# Patient Record
Sex: FEMALE | Race: WHITE | NOT HISPANIC OR LATINO | Employment: FULL TIME | URBAN - METROPOLITAN AREA
[De-identification: names, ages, dates, MRNs, and addresses within clinical notes are randomized per-mention and may not be internally consistent; named-entity substitution may affect disease eponyms.]

---

## 2024-02-24 ENCOUNTER — HOSPITAL ENCOUNTER (EMERGENCY)
Facility: CLINIC | Age: 59
Discharge: HOME | End: 2024-02-24
Attending: EMERGENCY MEDICINE | Admitting: EMERGENCY MEDICINE
Payer: COMMERCIAL

## 2024-02-24 VITALS
HEART RATE: 54 BPM | WEIGHT: 191 LBS | HEIGHT: 68 IN | SYSTOLIC BLOOD PRESSURE: 161 MMHG | TEMPERATURE: 97.5 F | DIASTOLIC BLOOD PRESSURE: 75 MMHG | BODY MASS INDEX: 28.95 KG/M2 | RESPIRATION RATE: 17 BRPM | OXYGEN SATURATION: 96 %

## 2024-02-24 DIAGNOSIS — R00.2 PALPITATIONS: ICD-10-CM

## 2024-02-24 DIAGNOSIS — R03.0 ELEVATED BLOOD PRESSURE READING: ICD-10-CM

## 2024-02-24 LAB
ALBUMIN SERPL BCG-MCNC: 4.6 G/DL (ref 3.5–5.2)
ALP SERPL-CCNC: 160 U/L (ref 40–150)
ALT SERPL W P-5'-P-CCNC: 33 U/L (ref 0–50)
ANION GAP SERPL CALCULATED.3IONS-SCNC: 13 MMOL/L (ref 7–15)
ANION GAP SERPL CALCULATED.3IONS-SCNC: 13 MMOL/L (ref 7–15)
AST SERPL W P-5'-P-CCNC: 26 U/L (ref 0–45)
ATRIAL RATE - MUSE: 80 BPM
BILIRUB SERPL-MCNC: 0.4 MG/DL
BUN SERPL-MCNC: 16.2 MG/DL (ref 8–23)
BUN SERPL-MCNC: 16.2 MG/DL (ref 8–23)
CALCIUM SERPL-MCNC: 9.2 MG/DL (ref 8.6–10)
CALCIUM SERPL-MCNC: 9.2 MG/DL (ref 8.6–10)
CHLORIDE SERPL-SCNC: 100 MMOL/L (ref 98–107)
CHLORIDE SERPL-SCNC: 100 MMOL/L (ref 98–107)
CREAT SERPL-MCNC: 1.01 MG/DL (ref 0.51–0.95)
CREAT SERPL-MCNC: 1.01 MG/DL (ref 0.51–0.95)
DEPRECATED HCO3 PLAS-SCNC: 24 MMOL/L (ref 22–29)
DEPRECATED HCO3 PLAS-SCNC: 24 MMOL/L (ref 22–29)
DIASTOLIC BLOOD PRESSURE - MUSE: 91 MMHG
EGFRCR SERPLBLD CKD-EPI 2021: 64 ML/MIN/1.73M2
EGFRCR SERPLBLD CKD-EPI 2021: 64 ML/MIN/1.73M2
GLUCOSE SERPL-MCNC: 143 MG/DL (ref 70–99)
GLUCOSE SERPL-MCNC: 143 MG/DL (ref 70–99)
HOLD SPECIMEN: NORMAL
INTERPRETATION ECG - MUSE: NORMAL
P AXIS - MUSE: 55 DEGREES
POTASSIUM SERPL-SCNC: 3.4 MMOL/L (ref 3.4–5.3)
POTASSIUM SERPL-SCNC: 3.4 MMOL/L (ref 3.4–5.3)
PR INTERVAL - MUSE: 156 MS
PROT SERPL-MCNC: 8.3 G/DL (ref 6.4–8.3)
QRS DURATION - MUSE: 86 MS
QT - MUSE: 394 MS
QTC - MUSE: 454 MS
R AXIS - MUSE: 30 DEGREES
SODIUM SERPL-SCNC: 137 MMOL/L (ref 135–145)
SODIUM SERPL-SCNC: 137 MMOL/L (ref 135–145)
SYSTOLIC BLOOD PRESSURE - MUSE: 207 MMHG
T AXIS - MUSE: 15 DEGREES
T4 FREE SERPL-MCNC: 1.09 NG/DL (ref 0.9–1.7)
TROPONIN T SERPL HS-MCNC: <6 NG/L
TSH SERPL DL<=0.005 MIU/L-ACNC: 4.29 UIU/ML (ref 0.3–4.2)
VENTRICULAR RATE- MUSE: 80 BPM

## 2024-02-24 PROCEDURE — 82040 ASSAY OF SERUM ALBUMIN: CPT | Performed by: EMERGENCY MEDICINE

## 2024-02-24 PROCEDURE — 84443 ASSAY THYROID STIM HORMONE: CPT | Performed by: EMERGENCY MEDICINE

## 2024-02-24 PROCEDURE — 84439 ASSAY OF FREE THYROXINE: CPT | Performed by: EMERGENCY MEDICINE

## 2024-02-24 PROCEDURE — 99284 EMERGENCY DEPT VISIT MOD MDM: CPT

## 2024-02-24 PROCEDURE — 250N000013 HC RX MED GY IP 250 OP 250 PS 637: Performed by: EMERGENCY MEDICINE

## 2024-02-24 PROCEDURE — 84484 ASSAY OF TROPONIN QUANT: CPT | Performed by: EMERGENCY MEDICINE

## 2024-02-24 PROCEDURE — 36415 COLL VENOUS BLD VENIPUNCTURE: CPT | Performed by: EMERGENCY MEDICINE

## 2024-02-24 PROCEDURE — 93005 ELECTROCARDIOGRAM TRACING: CPT | Performed by: EMERGENCY MEDICINE

## 2024-02-24 RX ORDER — METOPROLOL TARTRATE 25 MG/1
25 TABLET, FILM COATED ORAL 2 TIMES DAILY
Qty: 60 TABLET | Refills: 0 | Status: SHIPPED | OUTPATIENT
Start: 2024-02-24 | End: 2024-03-25

## 2024-02-24 RX ORDER — METOPROLOL TARTRATE 25 MG/1
25 TABLET, FILM COATED ORAL ONCE
Status: COMPLETED | OUTPATIENT
Start: 2024-02-24 | End: 2024-02-24

## 2024-02-24 RX ADMIN — METOPROLOL TARTRATE 25 MG: 25 TABLET, FILM COATED ORAL at 16:55

## 2024-02-24 ASSESSMENT — ACTIVITIES OF DAILY LIVING (ADL)
ADLS_ACUITY_SCORE: 35
ADLS_ACUITY_SCORE: 35

## 2024-02-24 ASSESSMENT — COLUMBIA-SUICIDE SEVERITY RATING SCALE - C-SSRS
6. HAVE YOU EVER DONE ANYTHING, STARTED TO DO ANYTHING, OR PREPARED TO DO ANYTHING TO END YOUR LIFE?: NO
2. HAVE YOU ACTUALLY HAD ANY THOUGHTS OF KILLING YOURSELF IN THE PAST MONTH?: NO
1. IN THE PAST MONTH, HAVE YOU WISHED YOU WERE DEAD OR WISHED YOU COULD GO TO SLEEP AND NOT WAKE UP?: NO

## 2024-02-24 NOTE — ED NOTES
When writer went in to give Metoprolol, patient stated she started to feel 2/10 chest discomfort. Describes as an achy pain. MD notified.
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
## 2024-02-24 NOTE — ED NOTES
Bed: [facility]ED-15  Expected date:   Expected time:   Means of arrival:   Comments:  EMS Palpitations HTN

## 2024-02-25 NOTE — DISCHARGE INSTRUCTIONS
Your thyroid may be a little low.  Check with your primary care physician  You have been prescribed Lopressor to control blood pressure and palpitation  Continue your other medication  I have entered a consult for cardiology.  They will contact you for outpatient follow-up

## 2024-02-25 NOTE — ED NOTES
Pt to D/C to home.  Pt provided with d/c instructions, including new medications, when medications were last given, and when to take them again.  Pt also informed to f/u with primary  within a few days. Also referral for cardiology placed.  Pt verbalized understanding of all d/c and f/u instructions.  All questions were answered at this time.  Copy of paperwork sent with pt.  Metoprolol script sent with pt.  Spouse to provide transport.  All personal belongings sent with pt.

## 2024-03-19 ENCOUNTER — OFFICE VISIT (OUTPATIENT)
Dept: CARDIOLOGY | Facility: CLINIC | Age: 59
End: 2024-03-19
Attending: EMERGENCY MEDICINE
Payer: COMMERCIAL

## 2024-03-19 VITALS
WEIGHT: 195 LBS | SYSTOLIC BLOOD PRESSURE: 160 MMHG | HEART RATE: 62 BPM | DIASTOLIC BLOOD PRESSURE: 80 MMHG | RESPIRATION RATE: 16 BRPM | BODY MASS INDEX: 29.65 KG/M2 | OXYGEN SATURATION: 98 %

## 2024-03-19 DIAGNOSIS — R01.1 HEART MURMUR: ICD-10-CM

## 2024-03-19 DIAGNOSIS — R00.2 PALPITATIONS: Primary | ICD-10-CM

## 2024-03-19 DIAGNOSIS — I10 BENIGN ESSENTIAL HYPERTENSION: ICD-10-CM

## 2024-03-19 PROCEDURE — 99244 OFF/OP CNSLTJ NEW/EST MOD 40: CPT | Performed by: STUDENT IN AN ORGANIZED HEALTH CARE EDUCATION/TRAINING PROGRAM

## 2024-03-19 RX ORDER — LOSARTAN POTASSIUM 100 MG/1
100 TABLET ORAL DAILY
COMMUNITY

## 2024-03-19 RX ORDER — AMLODIPINE BESYLATE 5 MG/1
5 TABLET ORAL DAILY
Qty: 30 TABLET | Refills: 3 | Status: SHIPPED | OUTPATIENT
Start: 2024-03-19 | End: 2024-07-18

## 2024-03-19 RX ORDER — ATORVASTATIN CALCIUM 40 MG/1
40 TABLET, FILM COATED ORAL DAILY
COMMUNITY
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
## 2024-03-19 NOTE — PATIENT INSTRUCTIONS
It was a pleasure meeting you today    In summary:    We will get an echocardiogram and event monitor to evaluate the palpitations.  Consider getting a smart ECG device such as the apple watch or [employer] device to monitor the rhythm at home.    Please call my nurse Kyler Munguia at [phone] if you have any questions or issues.    We will schedule a follow up visit in  6 months      Milad Holley DO [facility]  Non-invasive Cardiologist  [facility]

## 2024-03-27 ENCOUNTER — HOSPITAL ENCOUNTER (OUTPATIENT)
Dept: CARDIOLOGY | Facility: CLINIC | Age: 59
Discharge: HOME | End: 2024-03-27
Attending: STUDENT IN AN ORGANIZED HEALTH CARE EDUCATION/TRAINING PROGRAM
Payer: COMMERCIAL

## 2024-03-27 DIAGNOSIS — I10 BENIGN ESSENTIAL HYPERTENSION: ICD-10-CM

## 2024-03-27 DIAGNOSIS — R00.2 PALPITATIONS: ICD-10-CM

## 2024-03-27 LAB — LVEF ECHO: NORMAL

## 2024-03-27 PROCEDURE — 93270 REMOTE 30 DAY ECG REV/REPORT: CPT

## 2024-03-27 PROCEDURE — 93306 TTE W/DOPPLER COMPLETE: CPT | Mod: 26 | Performed by: INTERNAL MEDICINE

## 2024-03-27 PROCEDURE — 93306 TTE W/DOPPLER COMPLETE: CPT

## 2024-04-29 PROCEDURE — 93272 ECG/REVIEW INTERPRET ONLY: CPT | Performed by: INTERNAL MEDICINE

## 2024-07-18 DIAGNOSIS — I10 BENIGN ESSENTIAL HYPERTENSION: ICD-10-CM

## 2024-07-18 RX ORDER — AMLODIPINE BESYLATE 5 MG/1
5 TABLET ORAL DAILY
Qty: 30 TABLET | Refills: 3 | Status: SHIPPED | OUTPATIENT
Start: 2024-07-18

## 2024-11-19 DIAGNOSIS — I10 BENIGN ESSENTIAL HYPERTENSION: ICD-10-CM

## 2024-11-19 RX ORDER — AMLODIPINE BESYLATE 5 MG/1
5 TABLET ORAL DAILY
Qty: 90 TABLET | Refills: 1 | Status: SHIPPED | OUTPATIENT
Start: 2024-11-19

## 2025-03-04 ENCOUNTER — OFFICE VISIT (OUTPATIENT)
Dept: CARDIOLOGY | Facility: CLINIC | Age: 60
End: 2025-03-04
Payer: COMMERCIAL

## 2025-03-04 VITALS
RESPIRATION RATE: 16 BRPM | HEART RATE: 57 BPM | OXYGEN SATURATION: 98 % | SYSTOLIC BLOOD PRESSURE: 130 MMHG | DIASTOLIC BLOOD PRESSURE: 72 MMHG | WEIGHT: 194 LBS | BODY MASS INDEX: 29.5 KG/M2

## 2025-03-04 DIAGNOSIS — I47.10 SVT (SUPRAVENTRICULAR TACHYCARDIA): Primary | ICD-10-CM

## 2025-03-04 DIAGNOSIS — I35.1 MILD AORTIC REGURGITATION: ICD-10-CM

## 2025-03-04 DIAGNOSIS — R00.2 PALPITATIONS: ICD-10-CM

## 2025-03-04 DIAGNOSIS — I10 BENIGN ESSENTIAL HYPERTENSION: ICD-10-CM

## 2025-03-04 PROCEDURE — 99214 OFFICE O/P EST MOD 30 MIN: CPT

## 2025-03-04 PROCEDURE — G2211 COMPLEX E/M VISIT ADD ON: HCPCS

## 2025-03-04 PROCEDURE — 3075F SYST BP GE 130 - 139MM HG: CPT

## 2025-03-04 PROCEDURE — 3078F DIAST BP <80 MM HG: CPT

## 2025-03-04 RX ORDER — METOPROLOL SUCCINATE 25 MG/1
25 TABLET, EXTENDED RELEASE ORAL DAILY
Qty: 90 TABLET | Refills: 3 | Status: SHIPPED | OUTPATIENT
Start: 2025-03-04

## 2025-03-04 RX ORDER — SEMAGLUTIDE 0.25 MG/.5ML
0.25 INJECTION, SOLUTION SUBCUTANEOUS
COMMUNITY
Start: 2025-01-29

## 2025-03-04 NOTE — PATIENT INSTRUCTIONS
It was great to see you today! Your care team includes myself and Dr. Holley.    Recommendations:  Switch metoprolol to long actin form (succinate) and take once daily   Monitor your blood pressure at home, once per day, about 1-2 hours after taking your morning medications.  Please allow at least 5 minutes of rest prior to checking your blood pressure.  Alternatively, you can take three blood pressure readings ~5 minutes apart and average them.  Keep a home blood pressure log.    Notify the office if you are having worsening shortness of breath or chest pain with exertion or if you feel lightheaded or dizzy with exertion.    Adhere to the DASH diet or low sodium diet (2000mg or less per day) to help with blood pressures  Work on increasing physical activity with an end goal of 150 minutes per week of mild to moderate intensity exercise (brisk walk, biking, swimming)     Follow up with Dr. Holley in 1 year.      If you have questions, concerns, or new concerning symptoms prior to your next appointment, please contact the Cardiology Nursing team at [phone].    For scheduling issues/changes, please call [phone].

## 2025-03-04 NOTE — LETTER
3/4/2025    [facility]  [address] [facility] [address]    RE: Isidra Gonzalezmaryan       Dear Colleague,     I had the pleasure of seeing Isidra Guthrie in the [facility].  HEART CARE ENCOUNTER NOTE      [facility]  [phone]    Primary Care: Clinic, [facility]  Primary Cardiologist:  Dr. Holley    Assessment/Recommendations   Assessment:  HTN  Better controlled on losartan and amlodipine  HLD   in August 2024, on atorvastatin 40 mg  Palpitations  Likely due to brief episodes of SVT seen on monitor March 2024  Feels like she sometimes still has palpitations, especially if she misses a day or 2 of her medication  On metoprolol tartrate 25 mg twice daily but has only been taking daily which may account for her continued palpitations  Mild aortic regurgitation  Seen on echo March 2024  May be related to longstanding history of hypertension  Asymptomatic    Plan:  Switch metoprolol to tartrate to metoprolol succinate 25 mg for ease of dosing  Patient to update me in 1 to 2 weeks with response to this change  Plan for updated echo next year to follow-up on mild aortic regurgitation or sooner if new symptoms    Follow up with Dr. Holley in 1 year with echo or sooner if needed.    The longitudinal plan of care for the diagnosis(es)/condition(s) as documented were addressed during this visit. Due to the added complexity in care, I will continue to support Isidra in the subsequent management and with ongoing continuity of care.      History of Present Illness/Subjective     Isidra Guthrie is a 59 year old female with PMHx of palpitations, SVT, mild aortic regurgitation, HLD, HTN presenting for follow-up.      She was last seen by Dr. Holley on 3/19/2024.  At that time, patient was presenting for palpitations and a recent ED visit that showed PVCs.  She was noting some improvement on metoprolol.  She was recommended an echo and event  "monitor.  Her echo was unremarkable aside from mild to moderate aortic regurgitation.  Her monitor showed 2 short episodes of SVT and she was recommended to continue metoprolol and lifestyle changes.    Feeling well overall since last visit but notes she has occasional palpitations still.  Still missing her metoprolol occasionally and notices that her palpitations are worse if she does.  She also notes that she is only taken the metoprolol once per day but is prescribed for twice per day.  She was also started Wegovy and has had some weight loss with this.  No other symptoms such as chest pain, shortness of breath, lightheadedness, dizziness, syncope, presyncope, edema.    Cardiac Testing     Better controlled EKG, 2/24/2024: Sinus rhythm, PVC      Echo:    TTE, 3/27/2024  1.Left ventricular size, wall motion and function are normal. The ejection  fraction is 60-65%.  2.Normal right ventricle size and systolic function.  3.There is mild to moderate (1-2+) aortic regurgitation.  There is no comparison study available.      Cardiac event monitor, 3/27/2024  Cardiac event monitoring from 3/27/2024 to 4/25/2024 (monitored duration 18d 12h).  Baseline rhythm was sinus rhythm.    Reported heart rate range 50 to 120bpm, average 63bpm.  54 symptom triggers (\"symptoms other than listed\") correlated to sinus rhythm with PACs or short atrial runs. 2 non sustained SVT runs were noted <10 seconds long. Rare PVCs were noted.  1 automated recordings included sinus rhythm.  No sustained tachyarrhythmias.  No atrial fibrillation.  There were no pauses noted.  Supraventricular and ventricular ectopic beat frequency are not reported on this monitoring modality.        Labs:    Creatinine 0.8  Potassium 4  A1c 6.4    Physical Examination    Vitals: /72 (BP Location: Left arm, Patient Position: Sitting, Cuff Size: Adult Large)   Pulse 57   Resp 16   Wt 88 kg (194 lb)   SpO2 98%   BMI 29.50 kg/m      General: Well " appearing, in no acute distress. Resting comfortably in exam chair  Skin: No clubbing, no cyanosis.  Eyes: Extra ocular movements intact  Neck: No jugular venous distention, no carotid bruits, carotids have a normal upstroke  Lungs: Clear to auscultation bilaterally, no wheezing or rhonchi.  Heart: Regular rhythm, PMI not displaced, S1, S2 normal, no S3, no S4, no heaves, no rub and 3 out of 6 diastolic murmur.  Abdomen: Soft, nontender  Extremities: No peripheral edema . Grade 2/4 distal pulses bilaterally.  Neuro: Oriented to person, place and time, alert, cooperative, gait coordinated.    BP Readings from Last 3 Encounters:   03/04/25 130/72   03/19/24 (!) 160/80   02/24/24 (!) 161/75       Pulse Readings from Last 3 Encounters:   03/04/25 57   03/19/24 62   02/24/24 54       Wt Readings from Last 3 Encounters:   03/04/25 88 kg (194 lb)   03/19/24 88.5 kg (195 lb)   02/24/24 86.6 kg (191 lb)         Review of Systems      Please refer above for cardiac ROS details.       History     MEDICAL HISTORY:  No past medical history on file.  SURGICAL HISTORY  No past surgical history on file.   FAMILY HISTORY:  No family history on file. FAMILY HISTORY:  No family history on file.   SOCIAL HISTORY:  Social History     Socioeconomic History     Marital status:      Spouse name: Not on file     Number of children: Not on file     Years of education: Not on file     Highest education level: Not on file   Occupational History     Not on file   Tobacco Use     Smoking status: Former     Types: Cigarettes     Smokeless tobacco: Never   Substance and Sexual Activity     Alcohol use: Not on file     Drug use: Not on file     Sexual activity: Not on file   Other Topics Concern     Not on file   Social History Narrative     Not on file     Social Drivers of Health     Financial Resource Strain: Low Risk  (2/28/2024)    Received from [facility] [employer]     Financial Resource Strain      Difficulty of Paying Living Expenses: 3      Difficulty of Paying Living Expenses: Not on file   Food Insecurity: No Food Insecurity (2/28/2024)    Received from [facility] [facility]    Food Insecurity      Do you worry your food will run out before you are able to buy more?: 1   Transportation Needs: No Transportation Needs (2/28/2024)    Received from [facility]    Transportation Needs      Does lack of transportation keep you from medical appointments?: 1      Does lack of transportation keep you from work, meetings or getting things that you need?: 1   Physical Activity: Not on file   Stress: Not on file   Social Connections: Socially Integrated (2/28/2024)    [facility]    Social Connections      Do you often feel lonely or isolated from those around you?: 0   Interpersonal Safety: Not on file   Housing Stability: Low Risk  (2/28/2024)    Received from [facility]    Housing Stability      What is your housing situation today?: 1    ALLERGIES:  Allergies   Allergen Reactions     Egg Solids, Whole      Other Reaction(s): Stomach Upset            Medications:     Current Outpatient Medications   Medication Sig Dispense Refill     amLODIPine (NORVASC) 5 MG tablet Take 1 tablet (5 mg) by mouth daily. 90 tablet 1     atorvastatin (LIPITOR) 40 MG tablet Take 40 mg by mouth daily       losartan (COZAAR) 100 MG tablet Take 100 mg by mouth daily       metoprolol tartrate (LOPRESSOR) 25 MG tablet Take 1 tablet (25 mg) by mouth 2 times daily for 30 days 60 tablet 0           Rajesh Bower PA-C  March 4, 2025    This note was partially generated using Dragon voice recognition system, and there may be some incorrect words,  spellings, and punctuation that were not noted in checking the note before saving.      Thank you for allowing me to participate  in the care of your patient.      Sincerely,     Rajesh Bower PA-C     [facility]  cc:   Zelalem Rust MD  [address] [place] [id number]

## 2025-03-04 NOTE — PROGRESS NOTES
HEART CARE ENCOUNTER NOTE      [facility]  [phone]    Primary Care: Clinic, [facility]  Primary Cardiologist:  Dr. Holley    Assessment/Recommendations   Assessment:  HTN  Better controlled on losartan and amlodipine  HLD   in August 2024, on atorvastatin 40 mg  Palpitations  Likely due to brief episodes of SVT seen on monitor March 2024  Feels like she sometimes still has palpitations, especially if she misses a day or 2 of her medication  On metoprolol tartrate 25 mg twice daily but has only been taking daily which may account for her continued palpitations  Mild aortic regurgitation  Seen on echo March 2024  May be related to longstanding history of hypertension  Asymptomatic    Plan:  Switch metoprolol to tartrate to metoprolol succinate 25 mg for ease of dosing  Patient to update me in 1 to 2 weeks with response to this change  Plan for updated echo next year to follow-up on mild aortic regurgitation or sooner if new symptoms    Follow up with Dr. Holley in 1 year with echo or sooner if needed.    The longitudinal plan of care for the diagnosis(es)/condition(s) as documented were addressed during this visit. Due to the added complexity in care, I will continue to support Isidra in the subsequent management and with ongoing continuity of care.      History of Present Illness/Subjective     Isidra Guthrie is a 59 year old female with PMHx of palpitations, SVT, mild aortic regurgitation, HLD, HTN presenting for follow-up.      She was last seen by Dr. Holley on 3/19/2024.  At that time, patient was presenting for palpitations and a recent ED visit that showed PVCs.  She was noting some improvement on metoprolol.  She was recommended an echo and event monitor.  Her echo was unremarkable aside from mild to moderate aortic regurgitation.  Her monitor showed 2 short episodes of SVT and she was recommended to continue metoprolol and lifestyle changes.    Feeling well overall since  "last visit but notes she has occasional palpitations still.  Still missing her metoprolol occasionally and notices that her palpitations are worse if she does.  She also notes that she is only taken the metoprolol once per day but is prescribed for twice per day.  She was also started Wegovy and has had some weight loss with this.  No other symptoms such as chest pain, shortness of breath, lightheadedness, dizziness, syncope, presyncope, edema.    Cardiac Testing     Better controlled EKG, 2/24/2024: Sinus rhythm, PVC      Echo:    TTE, 3/27/2024  1.Left ventricular size, wall motion and function are normal. The ejection  fraction is 60-65%.  2.Normal right ventricle size and systolic function.  3.There is mild to moderate (1-2+) aortic regurgitation.  There is no comparison study available.      Cardiac event monitor, 3/27/2024  Cardiac event monitoring from 3/27/2024 to 4/25/2024 (monitored duration 18d 12h).  Baseline rhythm was sinus rhythm.    Reported heart rate range 50 to 120bpm, average 63bpm.  54 symptom triggers (\"symptoms other than listed\") correlated to sinus rhythm with PACs or short atrial runs. 2 non sustained SVT runs were noted <10 seconds long. Rare PVCs were noted.  1 automated recordings included sinus rhythm.  No sustained tachyarrhythmias.  No atrial fibrillation.  There were no pauses noted.  Supraventricular and ventricular ectopic beat frequency are not reported on this monitoring modality.        Labs:    Creatinine 0.8  Potassium 4  A1c 6.4    Physical Examination    Vitals: /72 (BP Location: Left arm, Patient Position: Sitting, Cuff Size: Adult Large)   Pulse 57   Resp 16   Wt 88 kg (194 lb)   SpO2 98%   BMI 29.50 kg/m      General: Well appearing, in no acute distress. Resting comfortably in exam chair  Skin: No clubbing, no cyanosis.  Eyes: Extra ocular movements intact  Neck: No jugular venous distention, no carotid bruits, carotids have a normal upstroke  Lungs: " Clear to auscultation bilaterally, no wheezing or rhonchi.  Heart: Regular rhythm, PMI not displaced, S1, S2 normal, no S3, no S4, no heaves, no rub and 3 out of 6 diastolic murmur.  Abdomen: Soft, nontender  Extremities: No peripheral edema . Grade 2/4 distal pulses bilaterally.  Neuro: Oriented to person, place and time, alert, cooperative, gait coordinated.    BP Readings from Last 3 Encounters:   03/04/25 130/72   03/19/24 (!) 160/80   02/24/24 (!) 161/75       Pulse Readings from Last 3 Encounters:   03/04/25 57   03/19/24 62   02/24/24 54       Wt Readings from Last 3 Encounters:   03/04/25 88 kg (194 lb)   03/19/24 88.5 kg (195 lb)   02/24/24 86.6 kg (191 lb)         Review of Systems      Please refer above for cardiac ROS details.       History     MEDICAL HISTORY:  No past medical history on file.  SURGICAL HISTORY  No past surgical history on file.   FAMILY HISTORY:  No family history on file. FAMILY HISTORY:  No family history on file.   SOCIAL HISTORY:  Social History     Socioeconomic History    Marital status:      Spouse name: Not on file    Number of children: Not on file    Years of education: Not on file    Highest education level: Not on file   Occupational History    Not on file   Tobacco Use    Smoking status: Former     Types: Cigarettes    Smokeless tobacco: Never   Substance and Sexual Activity    Alcohol use: Not on file    Drug use: Not on file    Sexual activity: Not on file   Other Topics Concern    Not on file   Social History Narrative    Not on file     Social Drivers of Health     Financial Resource Strain: Low Risk  (2/28/2024)    [facility]    Financial Resource Strain     Difficulty of Paying Living Expenses: 3     Difficulty of Paying Living Expenses: Not on file   Food Insecurity: No Food Insecurity (2/28/2024)    Received from [employer] [facility]    Food  Insecurity     Do you worry your food will run out before you are able to buy more?: 1   Transportation Needs: No Transportation Needs (2/28/2024)    Received from [facility]    Transportation Needs     Does lack of transportation keep you from medical appointments?: 1     Does lack of transportation keep you from work, meetings or getting things that you need?: 1   Physical Activity: Not on file   Stress: Not on file   Social Connections: Socially Integrated (2/28/2024)    Received from [facility]    Social Connections     Do you often feel lonely or isolated from those around you?: 0   Interpersonal Safety: Not on file   Housing Stability: Low Risk  (2/28/2024)    Received from [facility]    Housing Stability     What is your housing situation today?: 1    ALLERGIES:  Allergies   Allergen Reactions    Egg Solids, Whole      Other Reaction(s): Stomach Upset            Medications:     Current Outpatient Medications   Medication Sig Dispense Refill    amLODIPine (NORVASC) 5 MG tablet Take 1 tablet (5 mg) by mouth daily. 90 tablet 1    atorvastatin (LIPITOR) 40 MG tablet Take 40 mg by mouth daily      losartan (COZAAR) 100 MG tablet Take 100 mg by mouth daily      metoprolol tartrate (LOPRESSOR) 25 MG tablet Take 1 tablet (25 mg) by mouth 2 times daily for 30 days 60 tablet 0           Rajesh Bower PA-C  March 4, 2025    This note was partially generated using Dragon voice recognition system, and there may be some incorrect words,  spellings, and punctuation that were not noted in checking the note before saving.